# Patient Record
Sex: MALE | Race: WHITE
[De-identification: names, ages, dates, MRNs, and addresses within clinical notes are randomized per-mention and may not be internally consistent; named-entity substitution may affect disease eponyms.]

---

## 2018-05-10 ENCOUNTER — HOSPITAL ENCOUNTER (EMERGENCY)
Dept: HOSPITAL 45 - C.EDB | Age: 53
Discharge: HOME | End: 2018-05-10
Payer: COMMERCIAL

## 2018-05-10 VITALS
WEIGHT: 212.53 LBS | HEIGHT: 69.02 IN | HEIGHT: 69.02 IN | WEIGHT: 212.53 LBS | BODY MASS INDEX: 31.48 KG/M2 | BODY MASS INDEX: 31.48 KG/M2

## 2018-05-10 VITALS — SYSTOLIC BLOOD PRESSURE: 159 MMHG | OXYGEN SATURATION: 100 % | HEART RATE: 68 BPM | DIASTOLIC BLOOD PRESSURE: 88 MMHG

## 2018-05-10 VITALS — OXYGEN SATURATION: 100 %

## 2018-05-10 VITALS — TEMPERATURE: 97.34 F

## 2018-05-10 DIAGNOSIS — R73.9: ICD-10-CM

## 2018-05-10 DIAGNOSIS — N23: Primary | ICD-10-CM

## 2018-05-10 LAB
ALBUMIN SERPL-MCNC: 4.3 GM/DL (ref 3.4–5)
ALP SERPL-CCNC: 87 U/L (ref 45–117)
ALT SERPL-CCNC: 34 U/L (ref 12–78)
AST SERPL-CCNC: 18 U/L (ref 15–37)
BASOPHILS # BLD: 0.03 K/UL (ref 0–0.2)
BASOPHILS NFR BLD: 0.4 %
BUN SERPL-MCNC: 17 MG/DL (ref 7–18)
CALCIUM SERPL-MCNC: 8.8 MG/DL (ref 8.5–10.1)
CO2 SERPL-SCNC: 22 MMOL/L (ref 21–32)
CREAT SERPL-MCNC: 1.36 MG/DL (ref 0.6–1.4)
EOS ABS #: 0.07 K/UL (ref 0–0.5)
EOSINOPHIL NFR BLD AUTO: 255 K/UL (ref 130–400)
GLUCOSE SERPL-MCNC: 133 MG/DL (ref 70–99)
HCT VFR BLD CALC: 42.7 % (ref 42–52)
HGB BLD-MCNC: 14.7 G/DL (ref 14–18)
IG#: 0.02 K/UL (ref 0–0.02)
IMM GRANULOCYTES NFR BLD AUTO: 17.8 %
LYMPHOCYTES # BLD: 1.36 K/UL (ref 1.2–3.4)
MCH RBC QN AUTO: 31.2 PG (ref 25–34)
MCHC RBC AUTO-ENTMCNC: 34.4 G/DL (ref 32–36)
MCV RBC AUTO: 90.7 FL (ref 80–100)
MONO ABS #: 0.51 K/UL (ref 0.11–0.59)
MONOCYTES NFR BLD: 6.7 %
NEUT ABS #: 5.65 K/UL (ref 1.4–6.5)
NEUTROPHILS # BLD AUTO: 0.9 %
NEUTROPHILS NFR BLD AUTO: 73.9 %
PMV BLD AUTO: 10.2 FL (ref 7.4–10.4)
POTASSIUM SERPL-SCNC: 3.6 MMOL/L (ref 3.5–5.1)
PROT SERPL-MCNC: 7.5 GM/DL (ref 6.4–8.2)
RED CELL DISTRIBUTION WIDTH CV: 11.8 % (ref 11.5–14.5)
RED CELL DISTRIBUTION WIDTH SD: 39.6 FL (ref 36.4–46.3)
SODIUM SERPL-SCNC: 139 MMOL/L (ref 136–145)
WBC # BLD AUTO: 7.64 K/UL (ref 4.8–10.8)

## 2018-05-10 NOTE — EMERGENCY ROOM VISIT NOTE
History


First contact with patient:  05:34


Chief Complaint:  FLANK PAIN


Stated Complaint:  KIDNEY STONE





History of Present Illness


The patient is a 53 year old male who presents to the Emergency Room with 

complaints of severe sudden onset of flank pain the past few hours of nausea 

and vomiting.  He describes pain as severe, 9 out of 10.  Nothing makes it 

better or worse.  No history of kidney stones.  There is a family history of 

kidney stones.  Patient denies penile pain, testicular pain, chest pain, dyspnea

, abdominal pain, urinary problems.  No injury to the area.  No drug use.





Review of Systems


An 10 system review of systems was completed with positives and pertinent 

negatives listed in the HPI.





Past Medical/Surgical History


Orthopedic surgery





Social History


Smoking Status:  Never Smoker


Smokeless Tobacco Use:  No


Alcohol Use:  occasionally


Drug Use:  none


Marital Status:  


Housing Status:  lives with family


Occupation Status:  employed





Current/Historical Medications


Scheduled


Ondasetron Odt (Zofran Odt), 4 MG SL Q6H





Scheduled PRN


Oxycodone Immediate Rel Tab (Roxicodone Ir), 1-2 TAB PO Q4H PRN for Severe Pain





Physical Exam


Vital Signs











  Date Time  Temp Pulse Resp B/P (MAP) Pulse Ox O2 Delivery O2 Flow Rate FiO2


 


5/10/18 06:37  72      


 


5/10/18 06:17  68 16 163/92 100 Room Air  


 


5/10/18 05:54  59      


 


5/10/18 05:50     100 Room Air  


 


5/10/18 05:31 36.3 62 24 165/91 100 Room Air  











Physical Exam


VITALS: Vitals are noted on the nurse's note and reviewed by myself.  Vital 

signs hypertensive


GENERAL: Pleasant male who appears in pain, in no acute distress, nondiaphoretic

, well-developed well-nourished.


SKIN: Capillary reflex less than 2 seconds.


HEENT: Normocephalic.  PERRLA.  EOMI.  Nares patent.  Mucous membranes moist.  

Neck is supple without nuchal rigidity.


HEART: Regular rate and rhythm without murmurs gallops or rubs.


LUNGS: Clear to auscultation bilaterally without wheezes, rales or rhonchi.  No 

retractions or accessory muscle use.


ABDOMEN: Positive bowel sounds x 4.  Normal tympanic percussion.  Soft, 

nontender, without masses or organomegaly. Barnes sign negative.  No guarding 

or rebound tenderness.  No CVA tenderness


MUSCULOSKELETAL: No gross musculoskeletal defects.  


NEURO: Patient was alert and oriented to person place and time.  Normal 

sensation to light and sharp touch.  No focal neurological deficits.





Medical Decision & Procedures


Laboratory Results


5/10/18 05:40








Red Blood Count 4.71, Mean Corpuscular Volume 90.7, Mean Corpuscular Hemoglobin 

31.2, Mean Corpuscular Hemoglobin Concent 34.4, Mean Platelet Volume 10.2, 

Neutrophils (%) (Auto) 73.9, Lymphocytes (%) (Auto) 17.8, Monocytes (%) (Auto) 

6.7, Eosinophils (%) (Auto) 0.9, Basophils (%) (Auto) 0.4, Neutrophils # (Auto) 

5.65, Lymphocytes # (Auto) 1.36, Monocytes # (Auto) 0.51, Eosinophils # (Auto) 

0.07, Basophils # (Auto) 0.03





5/10/18 05:40

















Test


  5/10/18


05:40 5/10/18


06:29


 


White Blood Count


  7.64 K/uL


(4.8-10.8) 


 


 


Red Blood Count


  4.71 M/uL


(4.7-6.1) 


 


 


Hemoglobin


  14.7 g/dL


(14.0-18.0) 


 


 


Hematocrit 42.7 % (42-52)  


 


Mean Corpuscular Volume


  90.7 fL


() 


 


 


Mean Corpuscular Hemoglobin


  31.2 pg


(25-34) 


 


 


Mean Corpuscular Hemoglobin


Concent 34.4 g/dl


(32-36) 


 


 


Platelet Count


  255 K/uL


(130-400) 


 


 


Mean Platelet Volume


  10.2 fL


(7.4-10.4) 


 


 


Neutrophils (%) (Auto) 73.9 %  


 


Lymphocytes (%) (Auto) 17.8 %  


 


Monocytes (%) (Auto) 6.7 %  


 


Eosinophils (%) (Auto) 0.9 %  


 


Basophils (%) (Auto) 0.4 %  


 


Neutrophils # (Auto)


  5.65 K/uL


(1.4-6.5) 


 


 


Lymphocytes # (Auto)


  1.36 K/uL


(1.2-3.4) 


 


 


Monocytes # (Auto)


  0.51 K/uL


(0.11-0.59) 


 


 


Eosinophils # (Auto)


  0.07 K/uL


(0-0.5) 


 


 


Basophils # (Auto)


  0.03 K/uL


(0-0.2) 


 


 


RDW Standard Deviation


  39.6 fL


(36.4-46.3) 


 


 


RDW Coefficient of Variation


  11.8 %


(11.5-14.5) 


 


 


Immature Granulocyte % (Auto) 0.3 %  


 


Immature Granulocyte # (Auto)


  0.02 K/uL


(0.00-0.02) 


 


 


Anion Gap


  11.0 mmol/L


(3-11) 


 


 


Est Creatinine Clear Calc


Drug Dose 70.3 ml/min 


  


 


 


Estimated GFR (


American) 67.4 


  


 


 


Estimated GFR (Non-


American 58.2 


  


 


 


BUN/Creatinine Ratio 12.7 (10-20)  


 


Calcium Level


  8.8 mg/dl


(8.5-10.1) 


 


 


Total Bilirubin


  0.5 mg/dl


(0.2-1) 


 


 


Direct Bilirubin


  0.1 mg/dl


(0-0.2) 


 


 


Aspartate Amino Transf


(AST/SGOT) 18 U/L (15-37) 


  


 


 


Alanine Aminotransferase


(ALT/SGPT) 34 U/L (12-78) 


  


 


 


Alkaline Phosphatase


  87 U/L


() 


 


 


Total Protein


  7.5 gm/dl


(6.4-8.2) 


 


 


Albumin


  4.3 gm/dl


(3.4-5.0) 


 


 


Urine Color  YELLOW 


 


Urine Appearance  CLEAR (CLEAR) 


 


Urine pH  7.0 (4.5-7.5) 


 


Urine Specific Gravity


  


  1.022


(1.000-1.030)


 


Urine Protein  TRACE (NEG) 


 


Urine Glucose (UA)  NEG (NEG) 


 


Urine Ketones  TRACE (NEG) 


 


Urine Occult Blood  3+ (NEG) 


 


Urine Nitrite  NEG (NEG) 


 


Urine Bilirubin  NEG (NEG) 


 


Urine Urobilinogen  NEG (NEG) 


 


Urine Leukocyte Esterase  TRACE (NEG) 











Medications Administered











 Medications


  (Trade)  Dose


 Ordered  Sig/Olga


 Route  Start Time


 Stop Time Status Last Admin


Dose Admin


 


 Morphine Sulfate


  (MoRPHine


 SULFATE INJ)  4 mg  NOW  STAT


 IV  5/10/18 05:37


 5/10/18 05:39 DC 5/10/18 05:43


4 MG


 


 Ondansetron HCl


  (Zofran Inj)  4 mg  NOW  STAT


 IV  5/10/18 05:37


 5/10/18 05:39 DC 5/10/18 05:42


4 MG


 


 Sodium Chloride  1,000 ml @ 


 999 mls/hr  Q1H1M STAT


 IV  5/10/18 05:37


 5/10/18 06:37 DC 5/10/18 05:44


999 MLS/HR


 


 Ketorolac


 Tromethamine


  (Toradol Inj)  10 mg  NOW  STAT


 IV  5/10/18 05:37


 5/10/18 05:39 DC 5/10/18 05:42


10 MG











ED Course


Prior records/ancillary studies reviewed.


Triage Nursing notes reviewed.


Additional history obtained from the family.





The patient's history was concerning for right flank pain.





Differential diagnosis:


Etiologies such as renal colic, appendicitis, diverticulitis, mesenteric 

ischemia, aortic pathology, infections, inflammatory bowel disease, PUD, 

biliary pathology, UTI, as well as others were entertained.





Physical examination findings:


As above.





ER treatment provided:


Morphine, Toradol, Zofran, IV fluids


On reassessment the patient felt better.





Diagnostic interpretation by me:


The labs revealed mild hyperglycemia without DKA. Urinalysis revealed 

hematuria. There was no sign of UTI.





Imaging studies:


CT of the abdomen and pelvis as above.


CT ABDOMEN & PELVIS Without Contrast:


Mild right hydronephrosis with a 5 mm calcification noted in the mid right 

ureter. Perinephric and


periureteral fat stranding noted.


Incidental note made of 2 nonobstructing calcifications involving inferior pole 

calyces on the left


measuring up to 6.5 mm.


Incidental note made of a 13 mm hypodense ovoid structure inferior margin of 

the right lobe of the


liver (series 3; image 180). 8 mm hypodense structure in segment 4 of the liver 

(series 3; image 88)


These are presumed hepatic cysts. Detailed evaluation limited on this 

noncontrast study. Recommend


nonemergent ultrasound for further evaluation, as clinically appropriate.


Radiologist: Dino Crane MD





It appears that the patient has isolated renal colic from a right sided stone.  

Patient's pain was under control.  He was neurovascularly and neurologically 

intact.  He was advised to take medications as directed, rest, stay well-

hydrated and strain his urine to the stone passes.  He is advised to follow-up 

with urology in a few days or here in the ER sooner for severe pain, fevers, 

vomiting, worsening signs or symptoms or as needed.  Patient was informed of 

incidental findings and advised to follow-up.





By the evaluation outlined above emergent etiologies such as appendicitis, 

diverticulitis, mesenteric ischemia, aortic pathology, infections, inflammatory 

bowel disease, PUD, biliary pathology, UTI, as well as others were deemed 

relatively unlikely.





The pt informed about the findings as listed above. All questions were answered 

and  pleased with the treatment. Return instructions were outlined and the 

patient was discharged in stable condition.





Outpatient prescription management:


Oxy IR 5mg 1-2 po Q4 hrs prn


Zofran





Referral:


The pt was referred to [facility] for follow up care 

regarding their stone.


or


The patient was referred back to their primary care physician for follow-up in 

2 to 3 days for a recheck of the current condition.





Case reviewed with my attending.





The chart was completed utilizing Dragon Speech voice recognition software.   

Grammatical errors, random word insertions, pronoun errors, and incomplete 

sentences are an occassional consequence of this system due to software 

limitations, ambient noise, and hardware issues.  Any formal questions or 

concerns about the content, text, or information contained within the body of 

this dictation should be directly addressed to the physician assistant for 

clarification.





Medical Decision


As above





[place] Drug Monitoring Program


Search Results:  patient reviewed within database, no issues identified





Medication Reconcilliation


Current Medication List:  was personally reviewed by me





Blood Pressure Screening


Patient's blood pressure:  Elevated blood pressure


Blood pressure disposition:  Elevated BP felt to be situational





Impression





 Primary Impression:  


 Renal colic on right side


 Additional Impression:  


 Hyperglycemia





Departure Information


Dispostion


Home / Self-Care





Condition


GOOD





Prescriptions





Ondasetron Odt (ZOFRAN ODT) 4 Mg Tab


4 MG SL Q6H, #10 TAB


   Prov: Christina Steinberg PA-C         5/10/18 


Oxycodone Immediate Rel Tab (ROXICODONE IR) 5 Mg Tab


1-2 TAB PO Q4H Y for Severe Pain, #15 TAB


   initial course


   Prov: Christina Steinberg PA-C         5/10/18





Referrals


Pro,Luis Alberto PENG M.D. (PCP)





Patient Instructions


My [facility]





Additional Instructions








DO NOT drive, drink alcohol, operate machinery, or perform dangerous activities 

today.  You were given medications in the ER that can affect your ability to 

safely function or operate a vehicle.





Your blood sugar was slightly elevated today.  Your blood pressure was slightly 

elevated today.  You most likely have benign liver cysts.  You should have all 

of these items rechecked with the family care doctor.





Oxycodone Immediate Release (OxyIR) 5mg: Take 1-2 pills every four hours for 

pain.  Avoid alcohol, operating machinery or dangerous equipment, working on 

ladders or roofs, DRIVING, or situations where being under the influence may be 

dangerous.  It is recommended to use an over-the-counter stool softener such as 

Colace, 100mg twice daily while taking this medication to avoid constipation.





Zofran 4 mg:  Take one every six hours as needed for nausea. Avoid alcohol, 

operating machinery or dangerous equipment, working on ladders or roofs, DRIVING

, or situations where being under the influence may be dangerous.





Ibuprofen(Motrin, Advil) may be used for fever or pain.  Use 600mg every six 

hours as needed.  Take with food.  Avoid using more than 2400mg in a 24 hour 

period.  Do not use 2400mg per day for more than three consecutive days without 

physician direction.  Prolonged inappropriate use can lead to stomach upset or 

ulcers. This medication can be taken if you need to drive, work, or perform 

activities which may be dangerous when taking narcotic pain medication.


(AND/OR)


Acetaminophen(Tylenol) may be used for fever or pain.  Use 1000mg every six 

hours as needed.  Avoid using more than 3000mg in a 24 hour period.  This 

medication can be taken if you need to drive, work, or perform activities which 

may be dangerous when taking narcotic pain medication.





Strain your urine and collect all the stones or debris for the urologists.





Rest and avoid strenuous activity until your stone passes and symptoms resolve.


Drink plenty of fluids.





Continue current medications.





Return to the ER for worsening abdominal or back pain, vomiting, fevers, 

passing out, or as needed.





Follow up with urology or family care in 2-3 days, call for an appointment.





Problem Qualifiers

## 2018-05-10 NOTE — DIAGNOSTIC IMAGING REPORT
ABDOMEN AND PELVIS CT WITHOUT CONTRAST



CT DOSE: 1586.38 mGy.cm



HISTORY: Right-sided  flank pain



TECHNIQUE: Multiaxial CT images of the abdomen and pelvis were performed without

the use of intravenous and oral contrast according to the standard department

stone protocol.  A dose lowering technique was utilized adhering to the

principles of ALARA.



COMPARISON STUDY: None.



FINDINGS: The lung bases are essentially clear. No pneumoperitoneum. No

pneumatosis. No acute fractures within the visualized osseous structures.

Hepatic steatosis. A 7 mm hypodense lesion within the left hepatic lobe and a

1.4 cm hypodense lesion within the right hepatic lobe. These are incompletely

characterize on this noncontrast study but favor cysts. The unenhanced spleen,

right adrenal gland, gallbladder, and pancreas are unremarkable. There is a 1.2

cm left adrenal gland nodule consistent with a benign adenoma. No

retroperitoneal lymphadenopathy. There is a 4 mm stone within the mid right

ureter on image 110 resulting in mild right hydronephrosis. There is right

perinephric fat stranding due to the obstruction. No right renal calculi. There

are 2 left renal calculi measuring up to 5 mm. The bladder is unremarkable.

Suboptimal evaluation for bowel pathology due to the lack of intravenous and

oral contrast. However, there is no definite bowel wall thickening or

obstruction. Normal appendix.



IMPRESSION:  



1. A 4 mm obstructing stone within the mid right ureter resulting in mild right

hydronephrosis.

2. Left-sided nephrolithiasis.

3. Additional findings as described above.







Electronically signed by:  Venkatesh Davies M.D.

5/10/2018 7:16 AM



Dictated Date/Time:  5/10/2018 7:11 AM